# Patient Record
Sex: FEMALE
[De-identification: names, ages, dates, MRNs, and addresses within clinical notes are randomized per-mention and may not be internally consistent; named-entity substitution may affect disease eponyms.]

---

## 2023-05-13 ENCOUNTER — NON-APPOINTMENT (OUTPATIENT)
Age: 38
End: 2023-05-13

## 2023-12-01 ENCOUNTER — NON-APPOINTMENT (OUTPATIENT)
Age: 38
End: 2023-12-01

## 2024-02-15 ENCOUNTER — NON-APPOINTMENT (OUTPATIENT)
Age: 39
End: 2024-02-15

## 2024-03-16 ENCOUNTER — NON-APPOINTMENT (OUTPATIENT)
Age: 39
End: 2024-03-16

## 2024-03-18 ENCOUNTER — APPOINTMENT (OUTPATIENT)
Dept: ORTHOPEDIC SURGERY | Facility: CLINIC | Age: 39
End: 2024-03-18
Payer: COMMERCIAL

## 2024-03-18 VITALS — BODY MASS INDEX: 19.26 KG/M2 | WEIGHT: 130 LBS | HEIGHT: 69 IN

## 2024-03-18 DIAGNOSIS — S93.602A UNSPECIFIED SPRAIN OF LEFT FOOT, INITIAL ENCOUNTER: ICD-10-CM

## 2024-03-18 DIAGNOSIS — Z00.00 ENCOUNTER FOR GENERAL ADULT MEDICAL EXAMINATION W/OUT ABNORMAL FINDINGS: ICD-10-CM

## 2024-03-18 DIAGNOSIS — Z78.9 OTHER SPECIFIED HEALTH STATUS: ICD-10-CM

## 2024-03-18 PROCEDURE — 73630 X-RAY EXAM OF FOOT: CPT | Mod: LT

## 2024-03-18 PROCEDURE — 99203 OFFICE O/P NEW LOW 30 MIN: CPT

## 2024-03-18 NOTE — ASSESSMENT
[FreeTextEntry1] : wbat cam boot [ice/elevate nsaids prn out from work rest from activity reeval 2 wks

## 2024-03-18 NOTE — PHYSICAL EXAM
[Left] : left foot and ankle [NL (40)] : plantar flexion 40 degrees [5___] : plantar flexion 5[unfilled]/5 [2+] : dorsalis pedis pulse: 2+ [] : no deltoid ligament tenderness [TWNoteComboBox7] : dorsiflexion 15 degrees

## 2024-03-18 NOTE — HISTORY OF PRESENT ILLNESS
[8] : 8 [2] : 2 [de-identified] : 03/18/2024:  inversion injury 3 days ago w/ ankle pain. went to  and given boot. prior fx tx w/o surgery. denies dm/tob.  [FreeTextEntry1] : left foot [de-identified] : boot [] : no [de-identified] : [facility] [de-identified] : Xray

## 2024-03-18 NOTE — DATA REVIEWED
[Left] : left [Outside X-rays] : outside x-rays [Ankle] : ankle [I reviewed the films/CD and additionally noted] : I reviewed the films/CD and additionally noted [FreeTextEntry1] : no acute fx -- patricia

## 2024-04-01 ENCOUNTER — TRANSCRIPTION ENCOUNTER (OUTPATIENT)
Age: 39
End: 2024-04-01

## 2024-04-01 ENCOUNTER — APPOINTMENT (OUTPATIENT)
Dept: ORTHOPEDIC SURGERY | Facility: CLINIC | Age: 39
End: 2024-04-01
Payer: COMMERCIAL

## 2024-04-01 VITALS — HEIGHT: 69 IN | WEIGHT: 130 LBS | BODY MASS INDEX: 19.26 KG/M2

## 2024-04-01 DIAGNOSIS — S93.602D UNSPECIFIED SPRAIN OF LEFT FOOT, SUBSEQUENT ENCOUNTER: ICD-10-CM

## 2024-04-01 PROCEDURE — 99213 OFFICE O/P EST LOW 20 MIN: CPT

## 2024-04-01 NOTE — HISTORY OF PRESENT ILLNESS
[8] : 8 [2] : 2 [de-identified] : 03/18/2024:  inversion injury 3 days ago w/ ankle pain. went to  and given boot. prior fx tx w/o surgery. denies dm/tob.   4/1/24: improving. walking in boot [] : no [FreeTextEntry1] : left foot [de-identified] : boot [de-identified] : [facility] [de-identified] : Xray

## 2024-04-01 NOTE — PHYSICAL EXAM
[Left] : left foot and ankle [NL (40)] : plantar flexion 40 degrees [2+] : dorsalis pedis pulse: 2+ [NL 30)] : inversion 30 degrees [NL (20)] : eversion 20 degrees [] : no pain on mid-foot stress [5___] : eversion 5[unfilled]/5 [FreeTextEntry8] : improved [TWNoteComboBox7] : dorsiflexion 15 degrees

## 2024-04-01 NOTE — ASSESSMENT
[FreeTextEntry1] : wbat transition to sneaker gradual inc activity as trip may return to work nsaids prn f/up 3 wks if not resolved

## 2024-05-31 ENCOUNTER — NON-APPOINTMENT (OUTPATIENT)
Age: 39
End: 2024-05-31

## 2024-08-15 PROBLEM — R92.8 CATEGORY 3 MAMMOGRAPHY RESULT WITH SHORT FOLLOW-UP INTERVAL SUGGESTED FOR PROBABLY BENIGN FINDING: Status: ACTIVE | Noted: 2024-08-15

## 2024-08-20 ENCOUNTER — NON-APPOINTMENT (OUTPATIENT)
Age: 39
End: 2024-08-20

## 2024-08-21 ENCOUNTER — APPOINTMENT (OUTPATIENT)
Dept: SURGICAL ONCOLOGY | Facility: CLINIC | Age: 39
End: 2024-08-21
Payer: COMMERCIAL

## 2024-08-21 ENCOUNTER — NON-APPOINTMENT (OUTPATIENT)
Age: 39
End: 2024-08-21

## 2024-08-21 VITALS
SYSTOLIC BLOOD PRESSURE: 128 MMHG | BODY MASS INDEX: 22.22 KG/M2 | DIASTOLIC BLOOD PRESSURE: 83 MMHG | HEART RATE: 55 BPM | HEIGHT: 69 IN | WEIGHT: 150 LBS | RESPIRATION RATE: 17 BRPM | OXYGEN SATURATION: 97 %

## 2024-08-21 DIAGNOSIS — R92.8 OTHER ABNORMAL AND INCONCLUSIVE FINDINGS ON DIAGNOSTIC IMAGING OF BREAST: ICD-10-CM

## 2024-08-21 PROCEDURE — 99203 OFFICE O/P NEW LOW 30 MIN: CPT

## 2024-08-21 NOTE — CONSULT LETTER
[Consult Letter:] : I had the pleasure of evaluating your patient, [unfilled]. [Please see my note below.] : Please see my note below. [Consult Closing:] : Thank you very much for allowing me to participate in the care of this patient.  If you have any questions, please do not hesitate to contact me. [Sincerely,] : Sincerely, [FreeTextEntry2] : Dr. Gilson Schrader

## 2024-08-21 NOTE — HISTORY OF PRESENT ILLNESS
[de-identified] : Ms. Ivett Matute is a 39 year old female who presents for an initial consultation for abnormal breast imaging, referred by Dr. Gilson Schrader.   Hx breast silicone implants in 2023 by Dr. Mayank Mcnulty denies family history of breast or ovarian cancer  Ivett has a palpable complaint in her right breast. A DM/US was performed on 6/18/2024 that showed a 1 cm oval circumscribed mass seen in the outer right breast corresponding to US finding of a circumscribed benign-appearing intramammary LN in the right breast. This has increased in size since 6/13/2023. 6 month DM/US of the right breast is recommended. BI-RADS 3

## 2024-08-21 NOTE — HISTORY OF PRESENT ILLNESS
[de-identified] : Ms. Ivett Matute is a 39 year old female who presents for an initial consultation for abnormal breast imaging, referred by Dr. Gilson Schrader.   Hx breast silicone implants in 2023 by Dr. Mayank Mcnulty denies family history of breast or ovarian cancer  Ivett has a palpable complaint in her right breast. A DM/US was performed on 6/18/2024 that showed a 1 cm oval circumscribed mass seen in the outer right breast corresponding to US finding of a circumscribed benign-appearing intramammary LN in the right breast. This has increased in size since 6/13/2023. 6 month DM/US of the right breast is recommended. BI-RADS 3

## 2024-08-21 NOTE — ASSESSMENT
[FreeTextEntry1] : IMP: Ivett is a 39 y.o female with a BI-RAD 3 imaging.  PLAN: Repeat R DM/US in Dec 2024-ordered  Return in 1 year  All medical entries were at my, Dr. Mateusz Caban, direction. I have reviewed the chart and agree that the record accurately reflects my personal performance of the history, physical exam, assessment and plan. Our office Nurse Practitioner was present of the duration of the office visit.

## 2024-08-21 NOTE — PHYSICAL EXAM
[Normal Supraclavicular Lymph Nodes] : normal supraclavicular lymph nodes [Normal Axillary Lymph Nodes] : normal axillary lymph nodes [Normal] : oriented to person, place and time, with appropriate affect [FreeTextEntry1] : SC present for exam  [de-identified] : Normal S1,S2. Regular rate and rhythm [de-identified] : Complete breast exam performed in supine and upright position. No palpable masses, tenderness, nipple discharge, inversion, deviation or enlarged axillary or supraclavicular lymph nodes bilaterally. [de-identified] : Clear breath sounds bilaterally with normal respiratory effort.

## 2024-08-21 NOTE — PHYSICAL EXAM
[Normal Supraclavicular Lymph Nodes] : normal supraclavicular lymph nodes [Normal Axillary Lymph Nodes] : normal axillary lymph nodes [Normal] : oriented to person, place and time, with appropriate affect [FreeTextEntry1] : SC present for exam  [de-identified] : Normal S1,S2. Regular rate and rhythm [de-identified] : Complete breast exam performed in supine and upright position. No palpable masses, tenderness, nipple discharge, inversion, deviation or enlarged axillary or supraclavicular lymph nodes bilaterally. [de-identified] : Clear breath sounds bilaterally with normal respiratory effort.

## 2025-01-24 ENCOUNTER — NON-APPOINTMENT (OUTPATIENT)
Age: 40
End: 2025-01-24